# Patient Record
Sex: MALE | Race: WHITE | Employment: FULL TIME | URBAN - METROPOLITAN AREA
[De-identification: names, ages, dates, MRNs, and addresses within clinical notes are randomized per-mention and may not be internally consistent; named-entity substitution may affect disease eponyms.]

---

## 2020-05-28 ENCOUNTER — HOSPITAL ENCOUNTER (OUTPATIENT)
Age: 30
Discharge: HOME | End: 2020-05-28
Attending: FAMILY MEDICINE

## 2020-05-28 VITALS
HEART RATE: 79 BPM | BODY MASS INDEX: 22.35 KG/M2 | DIASTOLIC BLOOD PRESSURE: 77 MMHG | OXYGEN SATURATION: 97 % | TEMPERATURE: 98 F | WEIGHT: 165 LBS | HEIGHT: 72 IN | SYSTOLIC BLOOD PRESSURE: 136 MMHG | RESPIRATION RATE: 18 BRPM

## 2020-05-28 DIAGNOSIS — L03.90 CELLULITIS, UNSPECIFIED CELLULITIS SITE: ICD-10-CM

## 2020-05-28 DIAGNOSIS — H00.011 HORDEOLUM EXTERNUM OF RIGHT UPPER EYELID: Primary | ICD-10-CM

## 2020-05-28 PROCEDURE — 99203 OFFICE O/P NEW LOW 30 MIN: CPT

## 2020-05-28 RX ORDER — CEFPROZIL 250 MG/1
250 TABLET, FILM COATED ORAL 2 TIMES DAILY
Qty: 14 TABLET | Refills: 0 | Status: SHIPPED | OUTPATIENT
Start: 2020-05-28

## 2020-05-28 NOTE — ED INITIAL ASSESSMENT (HPI)
C/o right upper eyelid swelling since yesterday and today got worse. Woke-up this morning eye crusty. Denies known injury, denies blurry vision.

## 2020-05-28 NOTE — ED PROVIDER NOTES
Patient Seen in: [facility]      History   Patient presents with:  Eyelid Swelling    Stated Complaint: EYE IRRITATION    HPI    This 20-year-old male presents to the office with complaint of increasing redness and swelling to t Normocephalic, atraumatic  EYES: Fundi benign, JACK, EOMI,sclera anicteric,  conjunctiva normal.  The right upper eyelid is swollen and erythematous. There appears to be a lesion on the medial aspect. No purulent drainage.   The left upper eyelid shows n not improving.

## 2020-09-04 ENCOUNTER — HOSPITAL ENCOUNTER (OUTPATIENT)
Age: 30
Discharge: HOME | End: 2020-09-04
Payer: OTHER GOVERNMENT

## 2020-09-04 VITALS
HEART RATE: 85 BPM | DIASTOLIC BLOOD PRESSURE: 85 MMHG | RESPIRATION RATE: 18 BRPM | SYSTOLIC BLOOD PRESSURE: 135 MMHG | TEMPERATURE: 98 F | OXYGEN SATURATION: 100 %

## 2020-09-04 DIAGNOSIS — J06.9 UPPER RESPIRATORY TRACT INFECTION, UNSPECIFIED TYPE: ICD-10-CM

## 2020-09-04 DIAGNOSIS — Z20.822 EXPOSURE TO COVID-19 VIRUS: Primary | ICD-10-CM

## 2020-09-04 PROCEDURE — 99214 OFFICE O/P EST MOD 30 MIN: CPT | Performed by: PHYSICIAN ASSISTANT

## 2020-09-04 NOTE — ED PROVIDER NOTES
Patient Seen in: [address]      History   Patient presents with:  Testing    Stated Complaint: covid sinuses off    HPI    CHIEF COMPLAINT: Mild URI symptoms, sinus congestion    HISTORY OF PRESENT ILLNESS: Patient is a pl 135/85   Pulse 85   Resp 18   Temp 98.3 °F (36.8 °C)   Temp src Temporal   SpO2 100 %   O2 Device None (Room air)       Current:/85   Pulse 85   Temp 98.3 °F (36.8 °C) (Temporal)   Resp 18   SpO2 100%         Physical Exam    Vital signs and nursing answered all of the patient's questions prior to discharge. Patient felt comfortable going home.               Disposition and Plan     Clinical Impression:  Exposure to COVID-19 virus  (primary encounter diagnosis)  Upper respiratory tract infection, unsp